# Patient Record
Sex: FEMALE | URBAN - METROPOLITAN AREA
[De-identification: names, ages, dates, MRNs, and addresses within clinical notes are randomized per-mention and may not be internally consistent; named-entity substitution may affect disease eponyms.]

---

## 2018-03-04 ENCOUNTER — OFFICE VISIT (OUTPATIENT)
Dept: URGENT CARE | Facility: URGENT CARE | Age: 18
End: 2018-03-04
Payer: COMMERCIAL

## 2018-03-04 ENCOUNTER — RADIANT APPOINTMENT (OUTPATIENT)
Dept: GENERAL RADIOLOGY | Facility: CLINIC | Age: 18
End: 2018-03-04
Attending: FAMILY MEDICINE
Payer: COMMERCIAL

## 2018-03-04 VITALS
DIASTOLIC BLOOD PRESSURE: 70 MMHG | WEIGHT: 196.6 LBS | BODY MASS INDEX: 29.8 KG/M2 | HEIGHT: 68 IN | OXYGEN SATURATION: 98 % | TEMPERATURE: 97.6 F | HEART RATE: 69 BPM | SYSTOLIC BLOOD PRESSURE: 117 MMHG

## 2018-03-04 DIAGNOSIS — M25.551 HIP PAIN, RIGHT: Primary | ICD-10-CM

## 2018-03-04 PROCEDURE — 99203 OFFICE O/P NEW LOW 30 MIN: CPT | Performed by: FAMILY MEDICINE

## 2018-03-04 PROCEDURE — 73502 X-RAY EXAM HIP UNI 2-3 VIEWS: CPT

## 2018-03-04 NOTE — PROGRESS NOTES
Subjective: 6 days ago playing volleyball patient went for a ball and landed on her right hip.  It hurt right away when she got up.  She has been kind of limping on it since then, hurts worse if she has been sitting for a while, cannot jump, had to stand for about 5 hours at a part-time job and Shifting onto the left side and towards the end her left toes were getting numb.  That went away.    Objective: She moves very carefully.  Examination of the right hip area shows no point tenderness.  She does not have a bruise.  There is no swelling.  It is however tender with movement of any kind.  X-ray was done.  I did not see a fracture    Assessment and plan: Right hip injury, deep bruise, symptomatic care and it could take a month to get better.    Addendum: She also showed me a lump on her left upper arm which has been there for many years but she thinks it might be growing.  It is 3 or 4 cm in diameter, vague edges, feels like a lipoma but I told her that if it is growing it definitely should be evaluated and she is will see her regular doctor.

## 2018-03-04 NOTE — NURSING NOTE
"Chief Complaint   Patient presents with     Urgent Care     Pt in clinic to have eval for right leg pain due to volleyball injury.     Musculoskeletal Problem       Initial /70  Pulse 69  Temp 97.6  F (36.4  C) (Tympanic)  Ht 5' 7.5\" (1.715 m)  Wt 196 lb 9.6 oz (89.2 kg)  SpO2 98%  BMI 30.34 kg/m2 Estimated body mass index is 30.34 kg/(m^2) as calculated from the following:    Height as of this encounter: 5' 7.5\" (1.715 m).    Weight as of this encounter: 196 lb 9.6 oz (89.2 kg).  Medication Reconciliation: complete   Nova Duran/ MA    "

## 2018-03-04 NOTE — MR AVS SNAPSHOT
"              After Visit Summary   3/4/2018    Gemini Brown    MRN: [id number]           Patient Information     Date Of Birth          2000        Visit Information        Provider Department      3/4/2018 12:50 PM Yonis John MD [facility]        Today's Diagnoses     Hip pain, right    -  1       Follow-ups after your visit        Who to contact     If you have questions or need follow up information about today's clinic visit or your schedule please contact [facility] directly at [phone].  Normal or non-critical lab and imaging results will be communicated to you by [facility], letter or phone within 4 business days after the clinic has received the results. If you do not hear from us within 7 days, please contact the clinic through [facility] or phone. If you have a critical or abnormal lab result, we will notify you by phone as soon as possible.  Submit refill requests through [employer] or call your pharmacy and they will forward the refill request to us. Please allow 3 business days for your refill to be completed.          Additional Information About Your Visit        MyChart Information     [employer] lets you send messages to your doctor, view your test results, renew your prescriptions, schedule appointments and more. To sign up, go to [email or web address] . Click on \"Log in\" on the left side of the screen, which will take you to the Welcome page. Then click on \"Sign up Now\" on the right side of the page.     You will be asked to enter the access code listed below, as well as some personal information. Please follow the directions to create your username and password.     Your access code is: [email or web address]  Expires: 2018  1:44 PM     Your access code will  in 90 days. If you need help or a new code, please call your [place] clinic or [phone].        Care EveryWhere ID     This is your Care EveryWhere ID. This could be used by " "other organizations to access your [place] [facility] medical records  [id number]        Your Vitals Were     Pulse Temperature Height Pulse Oximetry BMI (Body Mass Index)       69 97.6  F (36.4  C) (Tympanic) 5' 7.5\" (1.715 m) 98% 30.34 kg/m2        Blood Pressure from Last 3 Encounters:   03/04/18 117/70    Weight from Last 3 Encounters:   03/04/18 196 lb 9.6 oz (89.2 kg) (97 %)*     * Growth percentiles are based on [facility] 2-20 Years data.              We Performed the Following     XR Hip Right 2-3 Views        Primary Care Provider Fax #    Physician No Ref-Primary [phone]       No address on file        Equal Access to Services     LENNIE GUZMAN : Ivan Marcano, wabria cook, qaybta kaalmada adesarahiyaarjun, david arzola . So [facility] [phone].    ATENCIÓN: Si habla español, tiene a canela disposición servicios gratuitos de asistencia lingüística. Llame al [phone].    We comply with applicable federal civil rights laws and [place] laws. We do not discriminate on the basis of race, color, national origin, age, disability, sex, sexual orientation, or gender identity.            Thank you!     Thank you for choosing [facility]  for your care. Our goal is always to provide you with excellent care. Hearing back from our patients is one way we can continue to improve our services. Please take a few minutes to complete the written survey that you may receive in the mail after your visit with us. Thank you!             Your Updated Medication List - Protect others around you: Learn how to safely use, store and throw away your medicines at www.disposemymeds.org.          This list is accurate as of 3/4/18  1:44 PM.  Always use your most recent med list.                   Brand Name Dispense Instructions for use Diagnosis    levonorgestrel 20 MCG/24HR IUD    MIRENA     1 each by Intrauterine route once        TYLENOL PO             "

## 2018-03-04 NOTE — LETTER
To Whom It MayConcern:       Gemini Brown  was seen in our clinic on 3/4/2018        Patient may return to work on  Whenever the pain is better-could take a few weeks .                            Restrictions: standing/moving are painful    Comments: right hip injury. May not be able to do Volleyball for a few weeks due to inability to jump         Provider Signature:         YOLANDA John MD